# Patient Record
Sex: MALE | Race: OTHER | NOT HISPANIC OR LATINO | URBAN - METROPOLITAN AREA
[De-identification: names, ages, dates, MRNs, and addresses within clinical notes are randomized per-mention and may not be internally consistent; named-entity substitution may affect disease eponyms.]

---

## 2023-07-28 ENCOUNTER — EMERGENCY (EMERGENCY)
Facility: HOSPITAL | Age: 38
LOS: 0 days | Discharge: HOME | End: 2023-07-28
Attending: STUDENT IN AN ORGANIZED HEALTH CARE EDUCATION/TRAINING PROGRAM
Payer: COMMERCIAL

## 2023-07-28 VITALS
DIASTOLIC BLOOD PRESSURE: 71 MMHG | HEART RATE: 77 BPM | RESPIRATION RATE: 18 BRPM | OXYGEN SATURATION: 100 % | WEIGHT: 154.98 LBS | SYSTOLIC BLOOD PRESSURE: 107 MMHG | TEMPERATURE: 99 F | HEIGHT: 73 IN

## 2023-07-28 DIAGNOSIS — S20.212A CONTUSION OF LEFT FRONT WALL OF THORAX, INITIAL ENCOUNTER: ICD-10-CM
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
## 2023-07-28 DIAGNOSIS — R07.81 PLEURODYNIA: ICD-10-CM
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
## 2023-07-28 PROCEDURE — 99284 EMERGENCY DEPT VISIT MOD MDM: CPT

## 2023-07-28 PROCEDURE — 99284 EMERGENCY DEPT VISIT MOD MDM: CPT | Mod: 25

## 2023-07-28 PROCEDURE — 71250 CT THORAX DX C-: CPT | Mod: MA

## 2023-07-28 PROCEDURE — 71250 CT THORAX DX C-: CPT | Mod: 26,MA

## 2023-07-28 NOTE — ED PROVIDER NOTE - ATTENDING APP SHARED VISIT CONTRIBUTION OF CARE
Attending Rigo: I performed a history and physical exam of the patient and discussed their management with the PARKER. I have reviewed the PARKER note and agree with the documented findings and plan of care, except as noted. This was a shared visit with an PARKER. I reviewed and verified the documentation and independently performed my own history/exam/medical decision making. My medical decision making and observations are found above. Please refer to any progress notes for updates on clinical course. My notes supersedes the above ACP note in case of discrepancy

## 2023-07-28 NOTE — ED PROVIDER NOTE - OBJECTIVE STATEMENT
37 year old male with no significant past medical history presents to the ED with rib pain. Patient is renovating his house and tripped over a pile of material landed on a lamp pole. He now has pain to his left lower rib cage worse with deep inspiration. Denies head trauma, LOC, penetrating trauma, use of AC, fever, chills, chest pain, shortness of breath, abdominal pain, nausea, vomiting, diarrhea, constipation, dysuria, hematuria, lower extremity swelling, rash.

## 2023-07-28 NOTE — ED PROVIDER NOTE - CLINICAL SUMMARY MEDICAL DECISION MAKING FREE TEXT BOX
Attending Rigo: 36 y/o M w/ no sig PMH presenting w/ L lower rib pain. Seen w/ wife. Agree w/ above documented HPI/ROS/PE. Reports was doing work in his house when he tripped and fell, landing on lamp pole. Landed on L side of chest. Has been having worsening pain in L lower chest since then. Pain worse w/ moving and deep breaths. No meds prior to arrival. Pt overall appearing, no acute distress. Lungs clear. HR regular. Abd nondistended/soft/nontender. Non focal neuro exam. L lower ant chest wall w/ tenderness w/o crepitus or ecchymosis. Concern for rib fracture based on exam. Possible contusion. Do not suspect PTX given clear lungs. No concern for ACS given trauma. Will obtain imaging and provide analgesia. Will reassess the need for additional interventions as clinically warranted.

## 2023-07-28 NOTE — ED PROVIDER NOTE - NSFOLLOWUPINSTRUCTIONS_ED_ALL_ED_FT
Please take Ibuprofen 600mg every 8 hours as needed for pain alternating with Tylenol 1000mg every 8 hours as needed for pain.     Rib Contusion    A rib contusion is a deep bruise on your rib area. Contusions are the result of a blunt trauma that causes bleeding and injury to the tissues under the skin. A rib contusion may involve bruising of the ribs and of the skin and muscles in the area. The skin overlying the contusion may turn blue, purple, or yellow. Minor injuries will give you a painless contusion, but more severe contusions may stay painful and swollen for a few weeks.     CAUSES  A contusion is usually caused by a blow, trauma, or direct force to an area of the body. This often occurs while playing contact sports.    SYMPTOMS  Swelling and redness of the injured area.  Discoloration of the injured area.  Tenderness and soreness of the injured area.  Pain with or without movement.    DIAGNOSIS  The diagnosis can be made by taking a medical history and performing a physical exam. An X-ray, CT scan, or MRI may be needed to determine if there were any associated injuries, such as broken bones (fractures) or internal injuries.    TREATMENT  Often, the best treatment for a rib contusion is rest. Icing or applying cold compresses to the injured area may help reduce swelling and inflammation. Deep breathing exercises may be recommended to reduce the risk of partial lung collapse and pneumonia. Over-the-counter or prescription medicines may also be recommended for pain control.    HOME CARE INSTRUCTIONS  Apply ice to the injured area:   Put ice in a plastic bag.   Place a towel between your skin and the bag.   Leave the ice on for 20 minutes, 2–3 times per day.   Take medicines only as directed by your health care provider.   Rest the injured area. Avoid strenuous activity and any activities or movements that cause pain. Be careful during activities and avoid bumping the injured area.  Perform deep-breathing exercises as directed by your health care provider.   Do not lift anything that is heavier than 5 lb (2.3 kg) until your health care provider approves.   Do not use any tobacco products, including cigarettes, chewing tobacco, or electronic cigarettes. If you need help quitting, ask your health care provider.     SEEK MEDICAL CARE IF:  You have increased bruising or swelling.   You have pain that is not controlled with treatment.   You have a fever.    SEEK IMMEDIATE MEDICAL CARE IF:  You have difficulty breathing or shortness of breath.   You develop a continual cough, or you cough up thick or bloody sputum.   You feel sick to your stomach (nauseous), you throw up (vomit), or you have abdominal pain.     ADDITIONAL NOTES AND INSTRUCTIONS    Please follow up with your Primary MD in 24-48 hr.  Seek immediate medical care for any new/worsening signs or symptoms.

## 2023-07-28 NOTE — ED PROVIDER NOTE - PHYSICAL EXAMINATION
Physical Exam    Constitutional: No acute distress.   Eyes: Conjunctiva pink, Sclera clear, PERRLA, EOMI.  ENT: No sinus tenderness. No nasal discharge. No oropharyngeal erythema, edema, or exudates. Uvula midline.   Cardiovascular: Regular rate, regular rhythm. No noted murmurs rubs or gallops.  Respiratory: unlabored respiratory effort, clear to auscultation bilaterally no wheezing, rales or rhonchi  Gastrointestinal: Normal bowel sounds. soft, non distended, non-tender abdomen.   Musculoskeletal: supple neck, no midline tenderness. left low anterior chest wall tenderness  Integumentary: warm, dry, no rash  Neurologic: awake, alert, cranial nerves II-XII grossly intact, extremities’ motor and sensory functions grossly intact

## 2023-07-28 NOTE — ED ADULT NURSE NOTE - NSFALLUNIVINTERV_ED_ALL_ED
Bed/Stretcher in lowest position, wheels locked, appropriate side rails in place/Call bell, personal items and telephone in reach/Instruct patient to call for assistance before getting out of bed/chair/stretcher/Non-slip footwear applied when patient is off stretcher/[place] to call system/Physically safe environment - no spills, clutter or unnecessary equipment/Purposeful proactive rounding/Room/bathroom lighting operational, light cord in reach

## 2024-08-19 PROBLEM — Z00.00 ENCOUNTER FOR PREVENTIVE HEALTH EXAMINATION: Status: ACTIVE | Noted: 2024-08-19

## 2024-08-20 ENCOUNTER — APPOINTMENT (OUTPATIENT)
Dept: NEUROLOGY | Facility: CLINIC | Age: 39
End: 2024-08-20
Payer: COMMERCIAL

## 2024-08-20 VITALS
HEIGHT: 73 IN | BODY MASS INDEX: 23.19 KG/M2 | WEIGHT: 175 LBS | SYSTOLIC BLOOD PRESSURE: 131 MMHG | HEART RATE: 97 BPM | DIASTOLIC BLOOD PRESSURE: 83 MMHG

## 2024-08-20 DIAGNOSIS — R79.89 OTHER SPECIFIED ABNORMAL FINDINGS OF BLOOD CHEMISTRY: ICD-10-CM

## 2024-08-20 PROCEDURE — 99203 OFFICE O/P NEW LOW 30 MIN: CPT

## 2024-08-20 NOTE — PHYSICAL EXAM

## 2024-08-20 NOTE — HISTORY OF PRESENT ILLNESS
[FreeTextEntry1] : It's a pleasure to see . TORRI JHA In the office today. He is a 38 year -  old man  who presents to the office today for the evaluation of Elevated prolactin. he patient recently had a blood work panel done by his primary care physician (PCP). The results indicated an elevated prolactin level of 27.3. The PCP has requested a follow-up with neurology for further evaluation.   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

## 2025-04-09 ENCOUNTER — APPOINTMENT (OUTPATIENT)
Dept: NEUROLOGY | Facility: CLINIC | Age: 40
End: 2025-04-09